# Patient Record
Sex: FEMALE | Race: BLACK OR AFRICAN AMERICAN | NOT HISPANIC OR LATINO | URBAN - METROPOLITAN AREA
[De-identification: names, ages, dates, MRNs, and addresses within clinical notes are randomized per-mention and may not be internally consistent; named-entity substitution may affect disease eponyms.]

---

## 2018-08-15 ENCOUNTER — OUTPATIENT (OUTPATIENT)
Dept: OUTPATIENT SERVICES | Facility: HOSPITAL | Age: 83
LOS: 1 days | Discharge: HOME | End: 2018-08-15

## 2018-08-15 DIAGNOSIS — I63.9 CEREBRAL INFARCTION, UNSPECIFIED: ICD-10-CM

## 2019-01-01 ENCOUNTER — OUTPATIENT (OUTPATIENT)
Dept: OUTPATIENT SERVICES | Facility: HOSPITAL | Age: 84
LOS: 1 days | Discharge: HOME | End: 2019-01-01

## 2019-01-01 DIAGNOSIS — D64.9 ANEMIA, UNSPECIFIED: ICD-10-CM

## 2019-01-01 DIAGNOSIS — N18.9 CHRONIC KIDNEY DISEASE, UNSPECIFIED: ICD-10-CM

## 2019-01-01 DIAGNOSIS — E87.1 HYPO-OSMOLALITY AND HYPONATREMIA: ICD-10-CM

## 2019-01-01 DIAGNOSIS — E21.5 DISORDER OF PARATHYROID GLAND, UNSPECIFIED: ICD-10-CM

## 2019-01-01 DIAGNOSIS — E10.9 TYPE 1 DIABETES MELLITUS WITHOUT COMPLICATIONS: ICD-10-CM

## 2019-01-01 DIAGNOSIS — E11.9 TYPE 2 DIABETES MELLITUS WITHOUT COMPLICATIONS: ICD-10-CM

## 2019-01-01 DIAGNOSIS — E61.2 MAGNESIUM DEFICIENCY: ICD-10-CM

## 2020-01-01 ENCOUNTER — INPATIENT (INPATIENT)
Facility: HOSPITAL | Age: 85
LOS: 0 days | End: 2020-04-13
Attending: HOSPITALIST | Admitting: HOSPITALIST
Payer: MEDICARE

## 2020-01-01 VITALS — DIASTOLIC BLOOD PRESSURE: 67 MMHG | SYSTOLIC BLOOD PRESSURE: 119 MMHG | HEART RATE: 141 BPM

## 2020-01-01 VITALS
SYSTOLIC BLOOD PRESSURE: 129 MMHG | HEART RATE: 133 BPM | RESPIRATION RATE: 24 BRPM | OXYGEN SATURATION: 91 % | DIASTOLIC BLOOD PRESSURE: 67 MMHG

## 2020-01-01 DIAGNOSIS — Z87.891 PERSONAL HISTORY OF NICOTINE DEPENDENCE: ICD-10-CM

## 2020-01-01 DIAGNOSIS — K59.00 CONSTIPATION, UNSPECIFIED: ICD-10-CM

## 2020-01-01 DIAGNOSIS — Z51.5 ENCOUNTER FOR PALLIATIVE CARE: ICD-10-CM

## 2020-01-01 DIAGNOSIS — U07.1 COVID-19: ICD-10-CM

## 2020-01-01 DIAGNOSIS — E87.2 ACIDOSIS: ICD-10-CM

## 2020-01-01 DIAGNOSIS — D63.1 ANEMIA IN CHRONIC KIDNEY DISEASE: ICD-10-CM

## 2020-01-01 DIAGNOSIS — I69.352 HEMIPLEGIA AND HEMIPARESIS FOLLOWING CEREBRAL INFARCTION AFFECTING LEFT DOMINANT SIDE: ICD-10-CM

## 2020-01-01 DIAGNOSIS — E11.40 TYPE 2 DIABETES MELLITUS WITH DIABETIC NEUROPATHY, UNSPECIFIED: ICD-10-CM

## 2020-01-01 DIAGNOSIS — E11.65 TYPE 2 DIABETES MELLITUS WITH HYPERGLYCEMIA: ICD-10-CM

## 2020-01-01 DIAGNOSIS — N17.9 ACUTE KIDNEY FAILURE, UNSPECIFIED: ICD-10-CM

## 2020-01-01 DIAGNOSIS — J96.01 ACUTE RESPIRATORY FAILURE WITH HYPOXIA: ICD-10-CM

## 2020-01-01 DIAGNOSIS — E87.3 ALKALOSIS: ICD-10-CM

## 2020-01-01 DIAGNOSIS — I21.A1 MYOCARDIAL INFARCTION TYPE 2: ICD-10-CM

## 2020-01-01 DIAGNOSIS — E11.22 TYPE 2 DIABETES MELLITUS WITH DIABETIC CHRONIC KIDNEY DISEASE: ICD-10-CM

## 2020-01-01 DIAGNOSIS — D72.810 LYMPHOCYTOPENIA: ICD-10-CM

## 2020-01-01 DIAGNOSIS — I12.9 HYPERTENSIVE CHRONIC KIDNEY DISEASE WITH STAGE 1 THROUGH STAGE 4 CHRONIC KIDNEY DISEASE, OR UNSPECIFIED CHRONIC KIDNEY DISEASE: ICD-10-CM

## 2020-01-01 DIAGNOSIS — N18.4 CHRONIC KIDNEY DISEASE, STAGE 4 (SEVERE): ICD-10-CM

## 2020-01-01 DIAGNOSIS — Z66 DO NOT RESUSCITATE: ICD-10-CM

## 2020-01-01 LAB
ALBUMIN SERPL ELPH-MCNC: 3.1 G/DL — LOW (ref 3.5–5.2)
ALP SERPL-CCNC: 81 U/L — SIGNIFICANT CHANGE UP (ref 30–115)
ALT FLD-CCNC: 29 U/L — SIGNIFICANT CHANGE UP (ref 0–41)
ANION GAP SERPL CALC-SCNC: 24 MMOL/L — HIGH (ref 7–14)
AST SERPL-CCNC: 28 U/L — SIGNIFICANT CHANGE UP (ref 0–41)
BASOPHILS # BLD AUTO: 0.09 K/UL — SIGNIFICANT CHANGE UP (ref 0–0.2)
BASOPHILS NFR BLD AUTO: 0.6 % — SIGNIFICANT CHANGE UP (ref 0–1)
BILIRUB SERPL-MCNC: 0.3 MG/DL — SIGNIFICANT CHANGE UP (ref 0.2–1.2)
BUN SERPL-MCNC: 89 MG/DL — CRITICAL HIGH (ref 10–20)
CALCIUM SERPL-MCNC: 9.2 MG/DL — SIGNIFICANT CHANGE UP (ref 8.5–10.1)
CHLORIDE SERPL-SCNC: 111 MMOL/L — HIGH (ref 98–110)
CO2 SERPL-SCNC: 20 MMOL/L — SIGNIFICANT CHANGE UP (ref 17–32)
CREAT SERPL-MCNC: 6.8 MG/DL — CRITICAL HIGH (ref 0.7–1.5)
CRP SERPL-MCNC: 55.99 MG/DL — HIGH (ref 0–0.4)
D DIMER BLD IA.RAPID-MCNC: 1541 NG/ML DDU — HIGH (ref 0–230)
EOSINOPHIL # BLD AUTO: 0 K/UL — SIGNIFICANT CHANGE UP (ref 0–0.7)
EOSINOPHIL NFR BLD AUTO: 0 % — SIGNIFICANT CHANGE UP (ref 0–8)
FERRITIN SERPL-MCNC: 782 NG/ML — HIGH (ref 15–150)
GLUCOSE BLDC GLUCOMTR-MCNC: 216 MG/DL — HIGH (ref 70–99)
GLUCOSE SERPL-MCNC: 263 MG/DL — HIGH (ref 70–99)
HCT VFR BLD CALC: 35.6 % — LOW (ref 37–47)
HGB BLD-MCNC: 10.7 G/DL — LOW (ref 12–16)
IMM GRANULOCYTES NFR BLD AUTO: 1.2 % — HIGH (ref 0.1–0.3)
LDH SERPL L TO P-CCNC: 647 — HIGH (ref 50–242)
LYMPHOCYTES # BLD AUTO: 0.82 K/UL — LOW (ref 1.2–3.4)
LYMPHOCYTES # BLD AUTO: 5.1 % — LOW (ref 20.5–51.1)
MCHC RBC-ENTMCNC: 27.4 PG — SIGNIFICANT CHANGE UP (ref 27–31)
MCHC RBC-ENTMCNC: 30.1 G/DL — LOW (ref 32–37)
MCV RBC AUTO: 91.3 FL — SIGNIFICANT CHANGE UP (ref 81–99)
MONOCYTES # BLD AUTO: 1.3 K/UL — HIGH (ref 0.1–0.6)
MONOCYTES NFR BLD AUTO: 8.1 % — SIGNIFICANT CHANGE UP (ref 1.7–9.3)
NEUTROPHILS # BLD AUTO: 13.67 K/UL — HIGH (ref 1.4–6.5)
NEUTROPHILS NFR BLD AUTO: 85 % — HIGH (ref 42.2–75.2)
NRBC # BLD: 0 /100 WBCS — SIGNIFICANT CHANGE UP (ref 0–0)
PLATELET # BLD AUTO: 387 K/UL — SIGNIFICANT CHANGE UP (ref 130–400)
POTASSIUM SERPL-MCNC: 5.4 MMOL/L — HIGH (ref 3.5–5)
POTASSIUM SERPL-SCNC: 5.4 MMOL/L — HIGH (ref 3.5–5)
PROCALCITONIN SERPL-MCNC: 2.3 NG/ML — HIGH (ref 0.02–0.1)
PROT SERPL-MCNC: 8.5 G/DL — HIGH (ref 6–8)
RBC # BLD: 3.9 M/UL — LOW (ref 4.2–5.4)
RBC # FLD: 16.5 % — HIGH (ref 11.5–14.5)
SARS-COV-2 RNA SPEC QL NAA+PROBE: DETECTED
SODIUM SERPL-SCNC: 155 MMOL/L — HIGH (ref 135–146)
TROPONIN T SERPL-MCNC: 0.11 NG/ML — CRITICAL HIGH
WBC # BLD: 16.07 K/UL — HIGH (ref 4.8–10.8)
WBC # FLD AUTO: 16.07 K/UL — HIGH (ref 4.8–10.8)

## 2020-01-01 PROCEDURE — 99221 1ST HOSP IP/OBS SF/LOW 40: CPT

## 2020-01-01 PROCEDURE — 99497 ADVNCD CARE PLAN 30 MIN: CPT | Mod: 25

## 2020-01-01 PROCEDURE — 99285 EMERGENCY DEPT VISIT HI MDM: CPT

## 2020-01-01 PROCEDURE — 93010 ELECTROCARDIOGRAM REPORT: CPT

## 2020-01-01 PROCEDURE — 99223 1ST HOSP IP/OBS HIGH 75: CPT

## 2020-01-01 PROCEDURE — 71045 X-RAY EXAM CHEST 1 VIEW: CPT | Mod: 26

## 2020-01-01 RX ORDER — DEXTROSE 50 % IN WATER 50 %
15 SYRINGE (ML) INTRAVENOUS ONCE
Refills: 0 | Status: DISCONTINUED | OUTPATIENT
Start: 2020-01-01 | End: 2020-01-01

## 2020-01-01 RX ORDER — DEXTROSE 50 % IN WATER 50 %
25 SYRINGE (ML) INTRAVENOUS ONCE
Refills: 0 | Status: DISCONTINUED | OUTPATIENT
Start: 2020-01-01 | End: 2020-01-01

## 2020-01-01 RX ORDER — LANOLIN ALCOHOL/MO/W.PET/CERES
1 CREAM (GRAM) TOPICAL
Qty: 0 | Refills: 0 | DISCHARGE

## 2020-01-01 RX ORDER — INSULIN LISPRO 100/ML
VIAL (ML) SUBCUTANEOUS
Refills: 0 | Status: DISCONTINUED | OUTPATIENT
Start: 2020-01-01 | End: 2020-01-01

## 2020-01-01 RX ORDER — CEFTRIAXONE 500 MG/1
1000 INJECTION, POWDER, FOR SOLUTION INTRAMUSCULAR; INTRAVENOUS EVERY 24 HOURS
Refills: 0 | Status: DISCONTINUED | OUTPATIENT
Start: 2020-01-01 | End: 2020-01-01

## 2020-01-01 RX ORDER — ERYTHROPOIETIN 10000 [IU]/ML
6000 INJECTION, SOLUTION INTRAVENOUS; SUBCUTANEOUS
Qty: 0 | Refills: 0 | DISCHARGE

## 2020-01-01 RX ORDER — MORPHINE SULFATE 50 MG/1
2 CAPSULE, EXTENDED RELEASE ORAL ONCE
Refills: 0 | Status: DISCONTINUED | OUTPATIENT
Start: 2020-01-01 | End: 2020-01-01

## 2020-01-01 RX ORDER — POLYETHYLENE GLYCOL 3350 17 G/17G
17 POWDER, FOR SOLUTION ORAL AT BEDTIME
Refills: 0 | Status: DISCONTINUED | OUTPATIENT
Start: 2020-01-01 | End: 2020-01-01

## 2020-01-01 RX ORDER — SODIUM CHLORIDE 9 MG/ML
1000 INJECTION, SOLUTION INTRAVENOUS
Refills: 0 | Status: DISCONTINUED | OUTPATIENT
Start: 2020-01-01 | End: 2020-01-01

## 2020-01-01 RX ORDER — CALCITRIOL 0.5 UG/1
0.25 CAPSULE ORAL DAILY
Refills: 0 | Status: DISCONTINUED | OUTPATIENT
Start: 2020-01-01 | End: 2020-01-01

## 2020-01-01 RX ORDER — GLUCAGON INJECTION, SOLUTION 0.5 MG/.1ML
1 INJECTION, SOLUTION SUBCUTANEOUS ONCE
Refills: 0 | Status: DISCONTINUED | OUTPATIENT
Start: 2020-01-01 | End: 2020-01-01

## 2020-01-01 RX ORDER — SODIUM BICARBONATE 1 MEQ/ML
325 SYRINGE (ML) INTRAVENOUS THREE TIMES A DAY
Refills: 0 | Status: DISCONTINUED | OUTPATIENT
Start: 2020-01-01 | End: 2020-01-01

## 2020-01-01 RX ORDER — AZITHROMYCIN 500 MG/1
1 TABLET, FILM COATED ORAL
Qty: 0 | Refills: 0 | DISCHARGE

## 2020-01-01 RX ORDER — TIOTROPIUM BROMIDE 18 UG/1
1 CAPSULE ORAL; RESPIRATORY (INHALATION) DAILY
Refills: 0 | Status: DISCONTINUED | OUTPATIENT
Start: 2020-01-01 | End: 2020-01-01

## 2020-01-01 RX ORDER — ASPIRIN AND DIPYRIDAMOLE 25; 200 MG/1; MG/1
1 CAPSULE, EXTENDED RELEASE ORAL
Refills: 0 | Status: DISCONTINUED | OUTPATIENT
Start: 2020-01-01 | End: 2020-01-01

## 2020-01-01 RX ORDER — GABAPENTIN 400 MG/1
100 CAPSULE ORAL THREE TIMES A DAY
Refills: 0 | Status: DISCONTINUED | OUTPATIENT
Start: 2020-01-01 | End: 2020-01-01

## 2020-01-01 RX ORDER — METOPROLOL TARTRATE 50 MG
1 TABLET ORAL
Qty: 0 | Refills: 0 | DISCHARGE

## 2020-01-01 RX ORDER — METRONIDAZOLE 500 MG
500 TABLET ORAL EVERY 8 HOURS
Refills: 0 | Status: DISCONTINUED | OUTPATIENT
Start: 2020-01-01 | End: 2020-01-01

## 2020-01-01 RX ORDER — ACETAMINOPHEN 500 MG
650 TABLET ORAL EVERY 6 HOURS
Refills: 0 | Status: DISCONTINUED | OUTPATIENT
Start: 2020-01-01 | End: 2020-01-01

## 2020-01-01 RX ORDER — SENNA PLUS 8.6 MG/1
1 TABLET ORAL
Qty: 0 | Refills: 0 | DISCHARGE

## 2020-01-01 RX ORDER — ASPIRIN AND DIPYRIDAMOLE 25; 200 MG/1; MG/1
1 CAPSULE, EXTENDED RELEASE ORAL
Qty: 0 | Refills: 0 | DISCHARGE

## 2020-01-01 RX ORDER — LANOLIN ALCOHOL/MO/W.PET/CERES
3 CREAM (GRAM) TOPICAL AT BEDTIME
Refills: 0 | Status: DISCONTINUED | OUTPATIENT
Start: 2020-01-01 | End: 2020-01-01

## 2020-01-01 RX ORDER — CEFTRIAXONE 500 MG/1
INJECTION, POWDER, FOR SOLUTION INTRAMUSCULAR; INTRAVENOUS
Refills: 0 | Status: DISCONTINUED | OUTPATIENT
Start: 2020-01-01 | End: 2020-01-01

## 2020-01-01 RX ORDER — HYDROXYCHLOROQUINE SULFATE 200 MG
400 TABLET ORAL EVERY 24 HOURS
Refills: 0 | Status: DISCONTINUED | OUTPATIENT
Start: 2020-01-01 | End: 2020-01-01

## 2020-01-01 RX ORDER — HYDROMORPHONE HYDROCHLORIDE 2 MG/ML
0.5 INJECTION INTRAMUSCULAR; INTRAVENOUS; SUBCUTANEOUS
Refills: 0 | Status: DISCONTINUED | OUTPATIENT
Start: 2020-01-01 | End: 2020-01-01

## 2020-01-01 RX ORDER — GABAPENTIN 400 MG/1
1 CAPSULE ORAL
Qty: 0 | Refills: 0 | DISCHARGE

## 2020-01-01 RX ORDER — ACETAMINOPHEN 500 MG
975 TABLET ORAL ONCE
Refills: 0 | Status: COMPLETED | OUTPATIENT
Start: 2020-01-01 | End: 2020-01-01

## 2020-01-01 RX ORDER — CALCITRIOL 0.5 UG/1
1 CAPSULE ORAL
Qty: 0 | Refills: 0 | DISCHARGE

## 2020-01-01 RX ORDER — AMLODIPINE BESYLATE 2.5 MG/1
1 TABLET ORAL
Qty: 0 | Refills: 0 | DISCHARGE

## 2020-01-01 RX ORDER — CHLORHEXIDINE GLUCONATE 213 G/1000ML
1 SOLUTION TOPICAL
Refills: 0 | Status: DISCONTINUED | OUTPATIENT
Start: 2020-01-01 | End: 2020-01-01

## 2020-01-01 RX ORDER — CEFTRIAXONE 500 MG/1
1000 INJECTION, POWDER, FOR SOLUTION INTRAMUSCULAR; INTRAVENOUS ONCE
Refills: 0 | Status: COMPLETED | OUTPATIENT
Start: 2020-01-01 | End: 2020-01-01

## 2020-01-01 RX ORDER — SENNA PLUS 8.6 MG/1
2 TABLET ORAL AT BEDTIME
Refills: 0 | Status: DISCONTINUED | OUTPATIENT
Start: 2020-01-01 | End: 2020-01-01

## 2020-01-01 RX ORDER — HYDROXYCHLOROQUINE SULFATE 200 MG
TABLET ORAL
Refills: 0 | Status: DISCONTINUED | OUTPATIENT
Start: 2020-01-01 | End: 2020-01-01

## 2020-01-01 RX ORDER — SODIUM BICARBONATE 1 MEQ/ML
650 SYRINGE (ML) INTRAVENOUS THREE TIMES A DAY
Refills: 0 | Status: DISCONTINUED | OUTPATIENT
Start: 2020-01-01 | End: 2020-01-01

## 2020-01-01 RX ORDER — ALBUTEROL 90 UG/1
1 AEROSOL, METERED ORAL ONCE
Refills: 0 | Status: COMPLETED | OUTPATIENT
Start: 2020-01-01 | End: 2020-01-01

## 2020-01-01 RX ORDER — METOPROLOL TARTRATE 50 MG
25 TABLET ORAL DAILY
Refills: 0 | Status: DISCONTINUED | OUTPATIENT
Start: 2020-01-01 | End: 2020-01-01

## 2020-01-01 RX ORDER — HEPARIN SODIUM 5000 [USP'U]/ML
5000 INJECTION INTRAVENOUS; SUBCUTANEOUS EVERY 8 HOURS
Refills: 0 | Status: DISCONTINUED | OUTPATIENT
Start: 2020-01-01 | End: 2020-01-01

## 2020-01-01 RX ORDER — DEXTROSE 50 % IN WATER 50 %
12.5 SYRINGE (ML) INTRAVENOUS ONCE
Refills: 0 | Status: DISCONTINUED | OUTPATIENT
Start: 2020-01-01 | End: 2020-01-01

## 2020-01-01 RX ORDER — HYDROXYCHLOROQUINE SULFATE 200 MG
800 TABLET ORAL EVERY 24 HOURS
Refills: 0 | Status: COMPLETED | OUTPATIENT
Start: 2020-01-01 | End: 2020-01-01

## 2020-01-01 RX ORDER — POLYETHYLENE GLYCOL 3350 17 G/17G
1 POWDER, FOR SOLUTION ORAL
Qty: 0 | Refills: 0 | DISCHARGE

## 2020-01-01 RX ORDER — ALBUTEROL 90 UG/1
2 AEROSOL, METERED ORAL EVERY 6 HOURS
Refills: 0 | Status: DISCONTINUED | OUTPATIENT
Start: 2020-01-01 | End: 2020-01-01

## 2020-01-01 RX ORDER — SODIUM BICARBONATE 1 MEQ/ML
0.12 SYRINGE (ML) INTRAVENOUS
Qty: 150 | Refills: 0 | Status: DISCONTINUED | OUTPATIENT
Start: 2020-01-01 | End: 2020-01-01

## 2020-01-01 RX ORDER — SODIUM BICARBONATE 1 MEQ/ML
1 SYRINGE (ML) INTRAVENOUS
Qty: 0 | Refills: 0 | DISCHARGE

## 2020-01-01 RX ADMIN — HEPARIN SODIUM 5000 UNIT(S): 5000 INJECTION INTRAVENOUS; SUBCUTANEOUS at 05:56

## 2020-01-01 RX ADMIN — Medication 100 MILLIGRAM(S): at 23:18

## 2020-01-01 RX ADMIN — SODIUM CHLORIDE 50 MILLILITER(S): 9 INJECTION, SOLUTION INTRAVENOUS at 18:52

## 2020-01-01 RX ADMIN — Medication 650 MILLIGRAM(S): at 22:38

## 2020-01-01 RX ADMIN — CEFTRIAXONE 100 MILLIGRAM(S): 500 INJECTION, POWDER, FOR SOLUTION INTRAMUSCULAR; INTRAVENOUS at 18:36

## 2020-01-01 RX ADMIN — MORPHINE SULFATE 2 MILLIGRAM(S): 50 CAPSULE, EXTENDED RELEASE ORAL at 09:02

## 2020-01-01 RX ADMIN — Medication 650 MILLIGRAM(S): at 06:39

## 2020-01-01 RX ADMIN — HEPARIN SODIUM 5000 UNIT(S): 5000 INJECTION INTRAVENOUS; SUBCUTANEOUS at 21:39

## 2020-01-01 RX ADMIN — Medication 975 MILLIGRAM(S): at 16:30

## 2020-01-01 RX ADMIN — ALBUTEROL 1 PUFF(S): 90 AEROSOL, METERED ORAL at 17:48

## 2020-01-01 RX ADMIN — Medication 100 MILLIGRAM(S): at 05:57

## 2020-04-12 NOTE — H&P ADULT - NSHPPHYSICALEXAM_GEN_ALL_CORE
Vital Signs Last 24 Hrs  T(C): --  T(F): --  HR: 114 (12 Apr 2020 15:36) (114 - 133)  BP: 129/67 (12 Apr 2020 14:55) (129/67 - 129/67)  RR: 24 (12 Apr 2020 14:55) (24 - 24)  SpO2: 91% (12 Apr 2020 14:55) (91% - 91%)      PHYSICAL EXAM:  GENERAL: NAD, not answering questions, not in apparent distress, on 10L/min by face mask.  HEAD:  Atraumatic, Normocephalic  EYES:  conjunctiva and sclera clear  NECK: Supple   CHEST/LUNG: Bilateral rhonchi  HEART: Regular rate and rhythm; tachycardic  ABDOMEN: Soft, Nontender, Nondistended; No guarding  EXTREMITIES:  2+ Peripheral Pulses, No cyanosis or edema  PSYCH: AAOx3  NEUROLOGY: non-focal  SKIN: No rashes or lesions

## 2020-04-12 NOTE — ED PROVIDER NOTE - OBJECTIVE STATEMENT
84y F w/ PMH of CVA s/p L. sided residual deficits, dementia, HTN, HLD, and DM presents with difficulty breathing that started earlier today. States was at baseline. Developed cough, fever, and difficulty breathing today. EMS called. Was found to be hypoxic to 80s. Was placed on NRB prior to transport.

## 2020-04-12 NOTE — CONSULT NOTE ADULT - ASSESSMENT
83 y/o F with SINDY in hospital for difficulty breathing. r/o covid  PMH of CVA  with residual left sided deficits, dementia, HTN, HLD, DM, TIA and CKD stage IV    # SINDY on CKD 4 : baseline cr. ~ 4.0   - likely prerenal vs ATN   - place carolyn. please document uop   - strict I/O   - hemolyzed sample. potassium on vbg ~ 4.5 noted.   - pt is dry on exam   - start iv fluids with LR at 50 cc/hr   - VBG noted.   - ok to cont. po bicarb, Ca. vitamin D supplements.   - check UA, urine creatinine, Na, Pr/Cr ratio   - check serum CPK   - BP at goal   - Hb at goal.   - mild hypernatremia noted, can be due to dehydration. trend Na level.   - trend creatinine   - Ca at goal. check MG, phos.   - SOB/ b/l opacities on CXR: r/o covid. on HCQ, Rocephin, flagyl.    - avoid nephrotoxins and hypotension   - no need for RRT   - prognosis guarded    will follow

## 2020-04-12 NOTE — ED ADULT NURSE NOTE - NSIMPLEMENTINTERV_GEN_ALL_ED
Implemented All Fall with Harm Risk Interventions:  [place] to call system. Call bell, personal items and telephone within reach. Instruct patient to call for assistance. Room bathroom lighting operational. Non-slip footwear when patient is off stretcher. Physically safe environment: no spills, clutter or unnecessary equipment. Stretcher in lowest position, wheels locked, appropriate side rails in place. Provide visual cue, wrist band, yellow gown, etc. Monitor gait and stability. Monitor for mental status changes and reorient to person, place, and time. Review medications for side effects contributing to fall risk. Reinforce activity limits and safety measures with patient and family. Provide visual clues: red socks.

## 2020-04-12 NOTE — ED ADULT NURSE NOTE - CHIEF COMPLAINT QUOTE
BIBA from [facility] fever/cough/sob/desaturation, increased lethargy, MOLST form indicates full code, unable to obtain temp

## 2020-04-12 NOTE — ED PROVIDER NOTE - PHYSICAL EXAMINATION
CONSTITUTIONAL: In mild respiratory distress.   SKIN: warm, dry  HEAD: Normocephalic; atraumatic.  EYES: PERRL, EOMI, no conjunctival erythema  ENT: No nasal discharge; airway clear.  NECK: Supple; non tender.  CARD: S1, S2 normal; no murmurs, gallops, or rubs. Tachycardic.   RESP: Increase work of breathing. Diffuse ronchi.   ABD: soft ntnd  EXT: Normal ROM.  No clubbing, cyanosis or edema.   LYMPH: No acute cervical adenopathy.  NEURO: AO x 0. Not following commands.

## 2020-04-12 NOTE — ED ADULT NURSE NOTE - OBJECTIVE STATEMENT
pt arrival BIBEMS to ER critical care area from [facility] for fever shortness of breath cough and poor spo2 saturdation. pt presents to ER today contracted able to retract from painful stimuli 120's on cardiac monitoring placed on pulse oximetry with BP. pt presents lethargic responds to call of name, strong  on right hand. IV inserted 18g to right lower arm, blood cultures drawn and laboratory specimens drawn. will continue to closely monitor fall safety measures maintained.

## 2020-04-12 NOTE — H&P ADULT - ASSESSMENT
Ms. Lock is an 83 yo female patient from [facility] with PMH of CVA  with residual left sided deficits, dementia, HTN, HLD, DM, TIA and CKD stage IV, brought in tonight due to difficulty breathing that started earlier today.     # Suspected COVID-19 lower respiratory tract infection  # acute hypoxemic respiratory failure  # Possible aspiration pneumonia  - Tachycardia, tachypnea Ms. Lock is an 83 yo female patient from [facility] with PMH of CVA  with residual left sided deficits, dementia, HTN, HLD, DM, TIA and CKD stage IV, brought in tonight due to difficulty breathing that started earlier today.     # Suspected COVID-19 lower respiratory tract infection  # acute hypoxemic respiratory failure  # Possible aspiration pneumonia  - Tachycardia, tachypnea, hypoxia on admission  - Lymphopenia, elevated LDH. D-Dimer : 1450  - Chest Xray: bilateral infiltrates, more on the right side  - Saturation: 94% on 10 L/min O2.   - COVID-19 PCR sent. Maintain airborne precautions pending results.   - CRP, procalcitonin, ferritin received by lab  - QTc: 425, will start Plaquenil. No need to monitor QTc, Received 2 days of azithromycin.   - Given leukocytosis, and possible aspiration, will start ceftriaxone + metronidazole pending procalcitonin  - Albuterol + tiotropium + Symbicort.   - Speech and swallow evaluation.     # SINDY on CKD stage IV  - Unknown baseline creatinine  - No indication for urgent dialysis.   - Evaluated by nephrology, recommend LR: 50 cc/hour + accurate intake/output at this time  - Continue oral sodium bicarbonate + calcitriol + vitamin D    # Ischemic CVA with left sided weakness.   - Continue aspirin + dipyridamole     # HTN  - Holding amlodipine  - Continue metoprolol    # DM  - NPO  - Insulin by sliding scale if needed Ms. Lock is an 83 yo female patient from [facility] with PMH of CVA  with residual left sided deficits, dementia, HTN, HLD, DM, TIA and CKD stage IV, brought in tonight due to difficulty breathing that started earlier today.     # Suspected COVID-19 lower respiratory tract infection  # acute hypoxemic respiratory failure  # Possible aspiration pneumonia  - Tachycardia, tachypnea, hypoxia on admission  - Lymphopenia, elevated LDH. D-Dimer : 1450  - Chest Xray: bilateral infiltrates, more on the right side  - Saturation: 94% on 10 L/min O2.   - COVID-19 PCR sent. Maintain airborne precautions pending results.   - CRP, procalcitonin, ferritin received by lab  - QTc: 425, will start Plaquenil. No need to monitor QTc, Received 2 days of azithromycin.   - Given leukocytosis, and possible aspiration, will start ceftriaxone + metronidazole pending procalcitonin  - Albuterol + tiotropium + Symbicort.   - Speech and swallow evaluation.     # SINDY on CKD stage IV  - Unknown baseline creatinine  - No indication for urgent dialysis.   - Evaluated by nephrology, recommend LR: 50 cc/hour + accurate intake/output at this time  - Continue oral sodium bicarbonate + calcitriol + vitamin D    # Ischemic CVA with left sided weakness.   - Continue aspirin + dipyridamole     # HTN  - Holding amlodipine  - Continue metoprolol    # DM  - NPO  - Insulin by sliding scale if needed    DVT prophylaxis: Heparin 5000 s/c Q 8hours    COde status: Patient has advance directives, and MOLST form filled and last updated in December 2019, requesting CPR, intubation, and no limitation in medical treatment. Spoke with niece and confirmed her Aunt wishes Ms. Lock is an 83 yo female patient from [facility] with PMH of CVA  with residual left sided deficits, dementia, HTN, HLD, DM, TIA and CKD stage IV, brought in tonight due to difficulty breathing that started earlier today.     # Suspected COVID-19 lower respiratory tract infection  # acute hypoxemic respiratory failure  # Possible aspiration pneumonia  - Tachycardia, tachypnea, hypoxia on admission  - Lymphopenia, elevated LDH. D-Dimer : 1450  - Chest Xray: bilateral infiltrates, more on the right side  - Saturation: 94% on 10 L/min O2.   - COVID-19 PCR sent. Maintain airborne precautions pending results.   - CRP, procalcitonin, ferritin received by lab  - QTc: 425, will start Plaquenil. No need to monitor QTc, Received 2 days of azithromycin.   - Given leukocytosis, and possible aspiration, will start ceftriaxone + metronidazole pending procalcitonin  - Albuterol + tiotropium + Symbicort.   - Speech and swallow evaluation.   - MRSA screen    # ISNDY on CKD stage IV  - Unknown baseline creatinine  - No indication for urgent dialysis.   - Evaluated by nephrology, recommend LR: 50 cc/hour + accurate intake/output at this time  - Continue oral sodium bicarbonate + calcitriol + vitamin D    # Ischemic CVA with left sided weakness.   - Continue aspirin + dipyridamole     # HTN  - Holding amlodipine  - Continue metoprolol    # DM  - NPO  - Insulin by sliding scale if needed    DVT prophylaxis: Heparin 5000 s/c Q 8hours    COde status: Patient has advance directives, and MOLST form filled and last updated in December 2019, requesting CPR, intubation, and no limitation in medical treatment. Spoke with niece and confirmed her Aunt wishes

## 2020-04-12 NOTE — H&P ADULT - HISTORY OF PRESENT ILLNESS
Ms. Lock is an 85 yo female patient from [facility] with PMH of CVA  with residual left sided deficits, dementia, HTN, HLD, DM, TIA and CKD stage IV, brought in tonight due to difficulty breathing that started earlier today. Report noted that patient was at baseline. Developed cough, fever, and difficulty breathing today. EMS called. Was found to be hypoxic to 80s. Was placed on face mask prior to transport.     In ED, patient Ms. Lock is an 85 yo female patient from [facility] with PMH of CVA  with residual left sided deficits, dementia, HTN, HLD, DM, TIA and CKD stage IV, brought in tonight due to difficulty breathing that started earlier today. Report noted that patient was at baseline. Developed cough, fever, and difficulty breathing today. EMS called. Was found to be hypoxic to 80s. Was placed on face mask prior to transport.     In ED, patient was hypoxic, tachycardic, normotensive, saturating 91% on 8L/min. Chest Xray showed bilateral infiltrates, more on the right lower lobe. COVID-19 PCR sent. Patient is very poor historian, called the niece who said patient is hypotensive since admission to nursing home, and does not speak, and she says only few words when she visits her (last visit 2 weeks ago).

## 2020-04-12 NOTE — H&P ADULT - NSHPLABSRESULTS_GEN_ALL_CORE
Labs:                            10.7   16.07 )-----------( 387      ( 12 Apr 2020 16:10 )             35.6       04-12    155<H>  |  111<H>  |  89<HH>  ----------------------------<  263<H>  5.4<H>   |  20  |  6.8<HH>    Ca    9.2      12 Apr 2020 16:10    TPro  8.5<H>  /  Alb  3.1<L>  /  TBili  0.3  /  DBili  x   /  AST  28  /  ALT  29  /  AlkPhos  81  04-12          Lactate Trend      CARDIAC MARKERS ( 12 Apr 2020 16:10 )  x     / 0.11 ng/mL / x     / x     / x            CAPILLARY BLOOD GLUCOSE      Chest Xray: bilateral infiltrates.    ECG: Sinus tachycardia, Q waves in inferior leads, QTc 420

## 2020-04-12 NOTE — H&P ADULT - NSICDXPASTMEDICALHX_GEN_ALL_CORE_FT
PAST MEDICAL HISTORY:  Chronic kidney disease     CVA (cerebrovascular accident)     Dementia     Diabetes mellitus     Hyperlipidemia     Hypertension

## 2020-04-12 NOTE — ED ADULT TRIAGE NOTE - CHIEF COMPLAINT QUOTE
BIBA from [facility] fever/cough/sob/desaturation, increased lethargy, MOLST form indicates full code, unable to obtain temp

## 2020-04-12 NOTE — CONSULT NOTE ADULT - SUBJECTIVE AND OBJECTIVE BOX
NEPHROLOGY CONSULTATION NOTE    Ms. Lock is an 85 yo female patient from [facility] with PMH of CVA  with residual left sided deficits, dementia, HTN, HLD, DM, TIA and CKD stage IV, brought in tonight due to difficulty breathing that started earlier today. Report noted that patient was at baseline. Developed cough, fever, and difficulty breathing today. EMS called. Was found to be hypoxic to 80s. Was placed on face mask prior to transport.     In ED, patient was hypoxic, tachycardic, normotensive, saturating 91% on 8L/min. Chest Xray showed bilateral infiltrates, more on the right lower lobe. COVID-19 PCR sent. Patient is very poor historian, called the niece who said patient is hypotensive since admission to nursing home, and does not speak, and she says only few words when she visits her (last visit 2 weeks ago). (4/12/2020)    PAST MEDICAL & SURGICAL HISTORY:  Hypertension  Chronic kidney disease  Dementia  Hyperlipidemia  Diabetes mellitus  CVA (cerebrovascular accident)    Allergies:  No Known Allergies    Home Medications:  amLODIPine 10 mg oral tablet: 1 tab(s) orally once a day (12 Apr 2020 17:56)  aspirin-dipyridamole 25 mg-200 mg oral capsule, extended release: 1 cap(s) orally 2 times a day (12 Apr 2020 17:56)  azithromycin 250 mg oral tablet: 1 tab(s) orally once a day (12 Apr 2020 17:56)  calcitriol 0.25 mcg oral capsule: 1 cap(s) orally once a day (12 Apr 2020 17:56)  Calcium 600+D 600 mg-200 intl units (5 mcg) oral tablet: 1 tab(s) orally 2 times a day (12 Apr 2020 17:56)  epoetin pebbles 3000 units/mL injectable solution: 6000 unit(s) injectable every 7 days (12 Apr 2020 17:56)  gabapentin 100 mg oral capsule: 1 cap(s) orally 3 times a day (12 Apr 2020 17:56)  Melatonin 3 mg oral tablet: 1 tab(s) orally once a day (at bedtime) (12 Apr 2020 17:56)  Metoprolol Succinate ER 25 mg oral tablet, extended release: 1 tab(s) orally once a day (12 Apr 2020 17:56)  polyethylene glycol 3350 oral powder for reconstitution: 1 pad(s) orally once a day (at bedtime) (12 Apr 2020 17:56)  Senna 8.6 mg oral tablet: 1 tab(s) orally once a day (at bedtime) (12 Apr 2020 17:56)  sodium bicarbonate 325 mg oral tablet: 1 tab(s) orally 3 times a day (12 Apr 2020 17:56)    Hospital Medications:   MEDICATIONS  (STANDING):  calcitriol   Capsule 0.25 MICROGram(s) Oral daily  calcium carbonate 1250 mG  + Vitamin D (OsCal 500 + D) 1 Tablet(s) Oral daily  cefTRIAXone   IVPB      cefTRIAXone   IVPB 1000 milliGRAM(s) IV Intermittent once  chlorhexidine 4% Liquid 1 Application(s) Topical <User Schedule>  dipyridamole 200 mG/aspirin 25 mG 1 Capsule(s) Oral two times a day  gabapentin 100 milliGRAM(s) Oral three times a day  heparin  Injectable 5000 Unit(s) SubCutaneous every 8 hours  hydroxychloroquine 800 milliGRAM(s) Oral every 24 hours  hydroxychloroquine   Oral   lactated ringers. 1000 milliLiter(s) (50 mL/Hr) IV Continuous <Continuous>  melatonin 3 milliGRAM(s) Oral at bedtime  metoprolol succinate ER 25 milliGRAM(s) Oral daily  metroNIDAZOLE  IVPB 500 milliGRAM(s) IV Intermittent every 8 hours  polyethylene glycol 3350 17 Gram(s) Oral at bedtime  senna 2 Tablet(s) Oral at bedtime  sodium bicarbonate 325 milliGRAM(s) Oral three times a day  tiotropium 18 MICROgram(s) Capsule 1 Capsule(s) Inhalation daily      SOCIAL HISTORY:  Denies ETOH,Smoking,   FAMILY HISTORY:        REVIEW OF SYSTEMS:    All other review of systems is negative unless indicated above.    VITALS:  T(F): --  HR: 114 (04-12-20 @ 15:36)  BP: 129/67 (04-12-20 @ 14:55)  RR: 24 (04-12-20 @ 14:55)  SpO2: 91% (04-12-20 @ 14:55)      Weight (kg): 60 (04-12 @ 17:28)    I&O's Detail        PHYSICAL EXAM:  Constitutional: NAD  Respiratory: b/l rhonchi  Cardiovascular: S1, S2, RRR  Gastrointestinal: BS+, soft, NT/ND  Extremities:  No peripheral edema  Neurological: A/O x 1  :  No leon.     Vascular Access:    LABS:  04-12    155<H>  |  111<H>  |  89<HH>  ----------------------------<  263<H>  5.4<H>   |  20  |  6.8<HH>    Ca    9.2      12 Apr 2020 16:10    TPro  8.5<H>  /  Alb  3.1<L>  /  TBili  0.3  /  DBili      /  AST  28  /  ALT  29  /  AlkPhos  81  04-12    Creatinine Trend: 6.8 <--                        10.7   16.07 )-----------( 387      ( 12 Apr 2020 16:10 )             35.6     Troponin T, Serum: 0.11 ng/mL (04.12.20 @ 16:10)    Blood Gas Venous - Potassium: 4.5 mmoL/L (04.12.20 @ 16:33)  Blood Gas Profile - Venous (04.12.20 @ 16:33)    pH, Venous: 7.38    pCO2, Venous: 41 mmHg    pO2, Venous: 38 mmHg    HCO3, Venous: 24 mmoL/L    Base Excess, Venous: -0.7 mmoL/L    Oxygen Saturation, Venous: 62 %      Urine Studies:    RADIOLOGY & ADDITIONAL STUDIES:

## 2020-04-12 NOTE — H&P ADULT - ATTENDING COMMENTS
I saw and evaluated the patient. I have reviewed and agree with the findings and plan of care as documented above in the resident’s note (unless indicated differently below). Any necessary changes were made in the body of the text.    85 yo F pt from [facility] w/ a hx of CVA (residual left sided deficits), dementia, CKD4 and multiple comorbid ailments. Coming in after being sent for evaluation of shortness of breath. Onset one day ago. Course progressive and persistent w/ worsening. Associated w/ coughing and fever at the NH. No alleviating or exacerbating factors. Sick contacts: potentially other ill NH patients. No travel history. EMS found the patient hypoxic into the 80’s. The patient was placed on supplemental O2 and transported to the hospital thereafter.    ROS: the patient is unable to provide hx (as per maryann, the pt can only say a few words at baseline)  PMHx: CVA (left sided weakness), dementia, HTN, HLD, DM, TIA, CKD4    Home Meds: amlodipine 10 mg daily; ASA-dipyridamole  XR bid, azithromycin 200 mg daily, calcitriol 0.25 mcg daily, Ca-vitD 600 mg-200IU bid, epoietin pebbles qweekly, gabapentin 100 mg tid, melatonin 3 mg qHS, metoprolol succinate 25 mg daily, polyethylene glycol qHS, senna qHS, Na-bicarb 325 mg tid    SurHx: unknown (pt unable to provide)  FHx: unknown (pt unable to provide)  SocHx: no tobacco, alcohol or illicit drug use; former smoker    Exam:  Vitals: BP = 151/72; P = 128; T = 100.4; RR = 20  General: in bed; anxious appearing; labored breathing; upper airway congestion  Eyes: anicteric sclerae; moist conjunctiva  HENT: AT/NC; clear oropharynx w/ dry mucous membranes  Neck: supple; trachea midline  Lungs: bibasilar crackles (extending up to the mid lung field on the left); rhonci; no wheezing; +tachypneic and w/ somewhat labored breathing  CVS: regular rhythm; tachycardia; S1 and S2 w/o MRG’s  Abd: BS+; soft and non-tender to palpation; no masses or HSM  Extremities: LE non-edematous; peripheral pulses 2+ b/l  Skin: no decubitus ulcers noted; warm with proper turgor and texture on palpation except some atrophy  Psych: appears anxious and restless; alert; unable to ascertain orientation (pt does not respond to questions)    Labs significant for WBC 16.07, H&H 10.7/35.6, d-dimer 1541, Na 155, K 5.4 (hemolyzed), AG 24, bicarb 20, BUN/Cr 89/6.8, glucose 263, protein 8.5 w/ albumin 3.1, , trop 0.11, pH 7.38/pCO2 41  CXR: b/l airspace opacities (left > right)  EKG: sinus tach qTc 438  NH paperwork reviewed – information included herein (within the body of the note)  Old records in chart reviewed – information included herein (within the body of the note)    Assessment:  (1) Acute respiratory failure w/ hypoxia 2/2 viral respiratory syndrome r/o SARS-CoV-2 infection vs. a possible bacterial CAP  (2) SINDY on CKD4 – likely pre-renal vs. ATN vs. progression of disease  (3) AG metabolic acidosis w/ metabolic alkalosis (delta gap 3)  (4) DM w/ hyperglycemia and neuropathy  (5) Cardiac enzyme elevation likely 2/2 decreased clearance and demand (type II MI)  (6) Hyperkalemia on hemolyzed metabolic panel: need to repeat testing  (7) HTN – elevated BP  (8) Hx of CVA w/ residual deficits – ongoing concern that increases morbidity  (9) Anemia of chronic kidney disease  (10) Constipation    Plan:  (1) Supportive care: anti-emetics/anti-pyretics/analgesics/anti-tussives  (2) Goal SpO2 > 92: titrate O2 as appropriate  (3) Isolation precautions: airborne/droplet/contact  (4) Covid-19 PCR, CRP and procalcitonin level  (5) Daily CBC and CMP; check inflammatory markers; monitor H-score  (6) F/u blood culture and U/A as well as MRSA nares  (7) Start hydroxychloroquine  (8) Agree with empiric antibiotic coverage at this time  (9) Obtain U/A, urine protein:creatinine ratio and urine electrolytes  (10) Strict intake and output monitoring  (11) Gentle hydration: LR at 50 mL/hr is fine  (12) Avoid nephrotoxic agents  (13) C/w sodium bicarb tabs  (14) Blood glucose monitoring qAC and qHS w/ basal bolus to maintain goal BG < 180 mg/dL  (15) Repeat EKG in AM and trend cardiac enzyme levels  (16) Repeat K lvl with AM labs  (17) Bowel regimen  (18) Medications as dosed  (19) DVT ppx  (20) Speech and swallow evaluation    Prognosis guard: risk of significant clinical, especially pulmonary decline (is on NRBR at 15 L/min) saturating in the low 90’s and desaturates soon after the mask is removed (pt has been pulling at her own mask for most of the night necessitating restraints for this life-saving intervention).    Advanced directives: MOLST form from 12/2019 stating that all medical interventions are to be undertaken as necessary, was re-confirmed w/ the patient’s niece earlier today.  As such, the patient is full code. I saw and evaluated the patient. I have reviewed and agree with the findings and plan of care as documented above in the resident’s note (unless indicated differently below). Any necessary changes were made in the body of the text.    85 yo F pt from [facility] w/ a hx of CVA (residual left sided deficits), dementia, CKD4 and multiple comorbid ailments. Coming in after being sent for evaluation of shortness of breath. Onset one day ago. Course progressive and persistent w/ worsening. Associated w/ coughing and fever at the NH. No alleviating or exacerbating factors. Sick contacts: potentially other ill NH patients. No travel history. EMS found the patient hypoxic into the 80’s. The patient was placed on supplemental O2 and transported to the hospital thereafter.    ROS: the patient is unable to provide hx (as per maryann, the pt can only say a few words at baseline)  PMHx: CVA (left sided weakness), dementia, HTN, HLD, DM, TIA, CKD4    Home Meds: amlodipine 10 mg daily; ASA-dipyridamole  XR bid, azithromycin 200 mg daily, calcitriol 0.25 mcg daily, Ca-vitD 600 mg-200IU bid, epoietin pebbles qweekly, gabapentin 100 mg tid, melatonin 3 mg qHS, metoprolol succinate 25 mg daily, polyethylene glycol qHS, senna qHS, Na-bicarb 325 mg tid    SurHx: unknown (pt unable to provide)  FHx: unknown (pt unable to provide)  SocHx: no tobacco, alcohol or illicit drug use; former smoker    Exam:  Vitals: BP = 151/72; P = 128; T = 100.4; RR = 20  General: in bed; anxious appearing; labored breathing; upper airway congestion  Eyes: anicteric sclerae; moist conjunctiva  HENT: AT/NC; clear oropharynx w/ dry mucous membranes  Neck: supple; trachea midline  Lungs: bibasilar crackles (extending up to the mid lung field on the left); rhonci; no wheezing; +tachypneic and w/ somewhat labored breathing  CVS: regular rhythm; tachycardia; S1 and S2 w/o MRG’s  Abd: BS+; soft and non-tender to palpation; no masses or HSM  Extremities: LE non-edematous; peripheral pulses 2+ b/l  Skin: no decubitus ulcers noted; warm with proper turgor and texture on palpation except some atrophy  Psych: appears anxious and restless; alert; unable to ascertain orientation (pt does not respond to questions)    Labs significant for WBC 16.07, H&H 10.7/35.6, d-dimer 1541, Na 155, K 5.4 (hemolyzed), AG 24, bicarb 20, BUN/Cr 89/6.8, glucose 263, protein 8.5 w/ albumin 3.1, , trop 0.11, pH 7.38/pCO2 41  CXR: b/l airspace opacities (left > right)  EKG: sinus tach qTc 438  NH paperwork reviewed – information included herein (within the body of the note)  Old records in chart reviewed – information included herein (within the body of the note)    Assessment:  (1) Acute respiratory failure w/ hypoxia 2/2 viral respiratory syndrome r/o SARS-CoV-2 infection vs. a possible bacterial CAP  (2) SINDY on CKD4 – likely pre-renal vs. ATN vs. progression of disease  (3) AG metabolic acidosis w/ metabolic alkalosis (delta gap 3)  (4) DM w/ hyperglycemia and neuropathy  (5) Cardiac enzyme elevation likely 2/2 decreased clearance and demand (type II MI)  (6) Hyperkalemia on hemolyzed metabolic panel: need to repeat testing  (7) HTN – elevated BP  (8) Hx of CVA w/ residual deficits – ongoing concern that increases morbidity  (9) Anemia of chronic kidney disease  (10) Constipation    Plan:  (1) Supportive care: anti-emetics/anti-pyretics/analgesics/anti-tussives  (2) Goal SpO2 > 92: titrate O2 as appropriate  (3) Isolation precautions: airborne/droplet/contact  (4) Covid-19 PCR, CRP and procalcitonin level  (5) Daily CBC and CMP; check inflammatory markers; monitor H-score  (6) F/u blood culture and U/A as well as MRSA nares  (7) Start hydroxychloroquine  (8) Agree with empiric antibiotic coverage at this time  (9) Obtain U/A, urine protein:creatinine ratio and urine electrolytes  (10) Strict intake and output monitoring  (11) Gentle hydration: LR at 50 mL/hr is fine  (12) Avoid nephrotoxic agents  (13) C/w sodium bicarb tabs  (14) Blood glucose monitoring qAC and qHS w/ basal bolus to maintain goal BG < 180 mg/dL  (15) Repeat EKG in AM and trend cardiac enzyme levels  (16) Repeat K lvl with AM labs  (17) Bowel regimen  (18) Medications as dosed  (19) DVT ppx  (20) Speech and swallow evaluation    Prognosis guarded: risk of significant clinical (especially respiratory) decline (is on NRBR at 15 L/min) in this elderly female patient w/ multiple systemic ailments and dementia who is saturating in the low 90’s and desaturates soon after the mask is removed (pt has been pulling at her own mask for most of the night necessitating restraints for this life-saving intervention).    Advanced directives: MOLST form from 12/2019 stating that all medical interventions are to be undertaken as necessary, was re-confirmed w/ the patient’s niece earlier today.  As such, the patient is full code.

## 2020-04-13 NOTE — CONSULT NOTE ADULT - SUBJECTIVE AND OBJECTIVE BOX
REQUESTED OF: DR Bland    Chart reviewed, Hospital Day 2    JOLIE LOCK 84yFemale  HPI:  Ms. Lock is an 83 yo female patient from [facility] with PMH of CVA  with residual left sided deficits, dementia, HTN, HLD, DM, TIA and CKD stage IV, brought in tonight due to difficulty breathing that started earlier today. Report noted that patient was at baseline. Developed cough, fever, and difficulty breathing today. EMS called. Was found to be hypoxic to 80s. Was placed on face mask prior to transport.     In ED, patient was hypoxic, tachycardic, normotensive, saturating 91% on 8L/min. Chest Xray showed bilateral infiltrates, more on the right lower lobe. COVID-19 PCR sent. Patient is very poor historian, called the niece who said patient is hypotensive since admission to nursing home, and does not speak, and she says only few words when she visits her (last visit 2 weeks ago). (12 Apr 2020 17:35)        PAST MEDICAL & SURGICAL HISTORY:  Hypertension  Chronic kidney disease  Dementia  Hyperlipidemia  Diabetes mellitus  CVA (cerebrovascular accident)      Subjective and Objective:  Today,  Discussed with Dr An, admitting resident    Focused Palliative Care Evaluation:                   Symptoms:                                      Pain                                     Dyspnea                                     N/V                                     Appetite                                     Anxiety                                     Other _____________________                     Support Devices:              PHYSICAL EXAM:      Constitutional:    Eyes:    ENMT:    Neck:    Breasts:    Back:    Respiratory:    Cardiovascular:    Gastrointestinal:    Genitourinary:    Rectal:    Extremities:    Vascular:    Neurological:    Skin:    Lymph Nodes:    Musculoskeletal:    Psychiatric:        T(C): 39.1, Max: 39.1 (06:30)  HR: 141 (114 - 141)  BP: 119/67 (119/67 - 155/75)  RR: 26 (20 - 26)  SpO2: 93% (91% - 94%)      LABS/STUDIES:  04-12    155<H>  |  111<H>  |  89<HH>  ----------------------------<  263<H>  5.4<H>   |  20  |  6.8<HH>    Ca    9.2      12 Apr 2020 16:10    TPro  8.5<H>  /  Alb  3.1<L>  /  TBili  0.3  /  DBili  x   /  AST  28  /  ALT  29  /  AlkPhos  81  04-12                            10.7   16.07 )-----------( 387      ( 12 Apr 2020 16:10 )             35.6       MEDICATIONS  (STANDING):  calcitriol   Capsule 0.25 MICROGram(s) Oral daily  calcium carbonate 1250 mG  + Vitamin D (OsCal 500 + D) 1 Tablet(s) Oral daily  cefTRIAXone   IVPB      cefTRIAXone   IVPB 1000 milliGRAM(s) IV Intermittent every 24 hours  chlorhexidine 4% Liquid 1 Application(s) Topical <User Schedule>  dextrose 5%. 1000 milliLiter(s) (50 mL/Hr) IV Continuous <Continuous>  dextrose 50% Injectable 12.5 Gram(s) IV Push once  dextrose 50% Injectable 25 Gram(s) IV Push once  dextrose 50% Injectable 25 Gram(s) IV Push once  dipyridamole 200 mG/aspirin 25 mG 1 Capsule(s) Oral two times a day  gabapentin 100 milliGRAM(s) Oral three times a day  heparin  Injectable 5000 Unit(s) SubCutaneous every 8 hours  hydroxychloroquine 400 milliGRAM(s) Oral every 24 hours  hydroxychloroquine   Oral   insulin lispro (HumaLOG) corrective regimen sliding scale   SubCutaneous three times a day before meals  lactated ringers. 1000 milliLiter(s) (50 mL/Hr) IV Continuous <Continuous>  metoprolol succinate ER 25 milliGRAM(s) Oral daily  metroNIDAZOLE  IVPB 500 milliGRAM(s) IV Intermittent every 8 hours  polyethylene glycol 3350 17 Gram(s) Oral at bedtime  senna 2 Tablet(s) Oral at bedtime  sodium bicarbonate 650 milliGRAM(s) Oral three times a day  tiotropium 18 MICROgram(s) Capsule 1 Capsule(s) Inhalation daily    MEDICATIONS  (PRN):  acetaminophen   Tablet .. 650 milliGRAM(s) Oral every 6 hours PRN Temp greater or equal to 38C (100.4F), Mild Pain (1 - 3)  ALBUTerol    90 MICROgram(s) HFA Inhaler 2 Puff(s) Inhalation every 6 hours PRN Shortness of Breath and/or Wheezing  dextrose 40% Gel 15 Gram(s) Oral once PRN Blood Glucose LESS THAN 70 milliGRAM(s)/deciliter  glucagon  Injectable 1 milliGRAM(s) IntraMuscular once PRN Glucose LESS THAN 70 milligrams/deciliter          iStop:         PPS  Level    10%       Note PPS = Palliative Performance Scale; (c)2001, [facility]a Hospice Society       Range from 100% meaning Full ambulation/self-care/intake/Level of Consicous                                                                              to        10% meaning Bedbound/Unable to do any activity/extensive disease /Total Care/ No PO intake/ LOC=Full/drowsy/+/-confusion        (0% = death)                     Prior to acute illness, patient's functionality reportedly nursing home pt (+) debility REQUESTED OF: DR Bland    Chart reviewed, Hospital Day 2    JOLIE LOCK 84yFemale  HPI:  Ms. Lock is an 85 yo female patient from [facility] with PMH of CVA  with residual left sided deficits, dementia, HTN, HLD, DM, TIA and CKD stage IV, brought in tonight due to difficulty breathing that started earlier today. Report noted that patient was at baseline. Developed cough, fever, and difficulty breathing today. EMS called. Was found to be hypoxic to 80s. Was placed on face mask prior to transport.     In ED, patient was hypoxic, tachycardic, normotensive, saturating 91% on 8L/min. Chest Xray showed bilateral infiltrates, more on the right lower lobe. COVID-19 PCR sent. Patient is very poor historian, called the niece who said patient is hypotensive since admission to nursing home, and does not speak, and she says only few words when she visits her (last visit 2 weeks ago). (12 Apr 2020 17:35)        PAST MEDICAL & SURGICAL HISTORY:  Hypertension  Chronic kidney disease  Dementia  Hyperlipidemia  Diabetes mellitus  CVA (cerebrovascular accident)      Subjective and Objective:  Today,  Discussed with Dr An, admitting resident    Focused Palliative Care Evaluation:                   Symptoms:                                      Pain                                     Dyspnea YES                                     N/V                                     Appetite                                     Anxiety                                     Other _____________________                     Support Devices: non rebreather mask 13 liters              PHYSICAL EXAM:      Constitutional:    Eyes:    ENMT:    Neck:    Breasts:    Back:    Respiratory:    Cardiovascular:    Gastrointestinal:    Genitourinary:    Rectal:    Extremities:    Vascular:    Neurological:    Skin:    Lymph Nodes:    Musculoskeletal:    Psychiatric:        T(C): 39.1, Max: 39.1 (06:30)  HR: 141 (114 - 141)  BP: 119/67 (119/67 - 155/75)  RR: 26 (20 - 26)  SpO2: 93% (91% - 94%)      LABS/STUDIES:  04-12    155<H>  |  111<H>  |  89<HH>  ----------------------------<  263<H>  5.4<H>   |  20  |  6.8<HH>    Ca    9.2      12 Apr 2020 16:10    TPro  8.5<H>  /  Alb  3.1<L>  /  TBili  0.3  /  DBili  x   /  AST  28  /  ALT  29  /  AlkPhos  81  04-12                            10.7   16.07 )-----------( 387      ( 12 Apr 2020 16:10 )             35.6       MEDICATIONS  (STANDING):  calcitriol   Capsule 0.25 MICROGram(s) Oral daily  calcium carbonate 1250 mG  + Vitamin D (OsCal 500 + D) 1 Tablet(s) Oral daily  cefTRIAXone   IVPB      cefTRIAXone   IVPB 1000 milliGRAM(s) IV Intermittent every 24 hours  chlorhexidine 4% Liquid 1 Application(s) Topical <User Schedule>  dextrose 5%. 1000 milliLiter(s) (50 mL/Hr) IV Continuous <Continuous>  dextrose 50% Injectable 12.5 Gram(s) IV Push once  dextrose 50% Injectable 25 Gram(s) IV Push once  dextrose 50% Injectable 25 Gram(s) IV Push once  dipyridamole 200 mG/aspirin 25 mG 1 Capsule(s) Oral two times a day  gabapentin 100 milliGRAM(s) Oral three times a day  heparin  Injectable 5000 Unit(s) SubCutaneous every 8 hours  hydroxychloroquine 400 milliGRAM(s) Oral every 24 hours  hydroxychloroquine   Oral   insulin lispro (HumaLOG) corrective regimen sliding scale   SubCutaneous three times a day before meals  lactated ringers. 1000 milliLiter(s) (50 mL/Hr) IV Continuous <Continuous>  metoprolol succinate ER 25 milliGRAM(s) Oral daily  metroNIDAZOLE  IVPB 500 milliGRAM(s) IV Intermittent every 8 hours  polyethylene glycol 3350 17 Gram(s) Oral at bedtime  senna 2 Tablet(s) Oral at bedtime  sodium bicarbonate 650 milliGRAM(s) Oral three times a day  tiotropium 18 MICROgram(s) Capsule 1 Capsule(s) Inhalation daily    MEDICATIONS  (PRN):  acetaminophen   Tablet .. 650 milliGRAM(s) Oral every 6 hours PRN Temp greater or equal to 38C (100.4F), Mild Pain (1 - 3)  ALBUTerol    90 MICROgram(s) HFA Inhaler 2 Puff(s) Inhalation every 6 hours PRN Shortness of Breath and/or Wheezing  dextrose 40% Gel 15 Gram(s) Oral once PRN Blood Glucose LESS THAN 70 milliGRAM(s)/deciliter  glucagon  Injectable 1 milliGRAM(s) IntraMuscular once PRN Glucose LESS THAN 70 milligrams/deciliter          iStop:         PPS  Level    10%       Note PPS = Palliative Performance Scale; (c)2001, [facility] Hospice Society       Range from 100% meaning Full ambulation/self-care/intake/Level of Consicous                                                                              to        10% meaning Bedbound/Unable to do any activity/extensive disease /Total Care/ No PO intake/ LOC=Full/drowsy/+/-confusion        (0% = death)                     Prior to acute illness, patient's functionality reportedly nursing home pt (+) debility REQUESTED OF: DR Bland    Chart reviewed, Hospital Day 2    JOLIE LOCK 84yFemale  HPI:  Ms. Lock is an 85 yo female patient from [facility] with PMH of CVA  with residual left sided deficits, dementia, HTN, HLD, DM, TIA and CKD stage IV, brought in tonight due to difficulty breathing that started earlier today. Report noted that patient was at baseline. Developed cough, fever, and difficulty breathing today. EMS called. Was found to be hypoxic to 80s. Was placed on face mask prior to transport.     In ED, patient was hypoxic, tachycardic, normotensive, saturating 91% on 8L/min. Chest Xray showed bilateral infiltrates, more on the right lower lobe. COVID-19 PCR sent. Patient is very poor historian, called the niece who said patient is hypotensive since admission to nursing home, and does not speak, and she says only few words when she visits her (last visit 2 weeks ago). (12 Apr 2020 17:35)        PAST MEDICAL & SURGICAL HISTORY:  Hypertension  Chronic kidney disease  Dementia  Hyperlipidemia  Diabetes mellitus  CVA (cerebrovascular accident)      Subjective and Objective:  Today,  Discussed with Dr An, admitting resident    Focused Palliative Care Evaluation:                   Symptoms:                                      Pain                                     Dyspnea YES                                     N/V                                     Appetite                                     Anxiety                                     Other _____________________                     Support Devices: non rebreather mask 13 liters              PHYSICAL EXAM:    DEFER TO PRIMARY TEAM        T(C): 39.1, Max: 39.1 (06:30)  HR: 141 (114 - 141)  BP: 119/67 (119/67 - 155/75)  RR: 26 (20 - 26)  SpO2: 93% (91% - 94%)      LABS/STUDIES:  04-12    155<H>  |  111<H>  |  89<HH>  ----------------------------<  263<H>  5.4<H>   |  20  |  6.8<HH>    Ca    9.2      12 Apr 2020 16:10    TPro  8.5<H>  /  Alb  3.1<L>  /  TBili  0.3  /  DBili  x   /  AST  28  /  ALT  29  /  AlkPhos  81  04-12                            10.7   16.07 )-----------( 387      ( 12 Apr 2020 16:10 )             35.6       MEDICATIONS  (STANDING):  calcitriol   Capsule 0.25 MICROGram(s) Oral daily  calcium carbonate 1250 mG  + Vitamin D (OsCal 500 + D) 1 Tablet(s) Oral daily  cefTRIAXone   IVPB      cefTRIAXone   IVPB 1000 milliGRAM(s) IV Intermittent every 24 hours  chlorhexidine 4% Liquid 1 Application(s) Topical <User Schedule>  dextrose 5%. 1000 milliLiter(s) (50 mL/Hr) IV Continuous <Continuous>  dextrose 50% Injectable 12.5 Gram(s) IV Push once  dextrose 50% Injectable 25 Gram(s) IV Push once  dextrose 50% Injectable 25 Gram(s) IV Push once  dipyridamole 200 mG/aspirin 25 mG 1 Capsule(s) Oral two times a day  gabapentin 100 milliGRAM(s) Oral three times a day  heparin  Injectable 5000 Unit(s) SubCutaneous every 8 hours  hydroxychloroquine 400 milliGRAM(s) Oral every 24 hours  hydroxychloroquine   Oral   insulin lispro (HumaLOG) corrective regimen sliding scale   SubCutaneous three times a day before meals  lactated ringers. 1000 milliLiter(s) (50 mL/Hr) IV Continuous <Continuous>  metoprolol succinate ER 25 milliGRAM(s) Oral daily  metroNIDAZOLE  IVPB 500 milliGRAM(s) IV Intermittent every 8 hours  polyethylene glycol 3350 17 Gram(s) Oral at bedtime  senna 2 Tablet(s) Oral at bedtime  sodium bicarbonate 650 milliGRAM(s) Oral three times a day  tiotropium 18 MICROgram(s) Capsule 1 Capsule(s) Inhalation daily    MEDICATIONS  (PRN):  acetaminophen   Tablet .. 650 milliGRAM(s) Oral every 6 hours PRN Temp greater or equal to 38C (100.4F), Mild Pain (1 - 3)  ALBUTerol    90 MICROgram(s) HFA Inhaler 2 Puff(s) Inhalation every 6 hours PRN Shortness of Breath and/or Wheezing  dextrose 40% Gel 15 Gram(s) Oral once PRN Blood Glucose LESS THAN 70 milliGRAM(s)/deciliter  glucagon  Injectable 1 milliGRAM(s) IntraMuscular once PRN Glucose LESS THAN 70 milligrams/deciliter          iStop: Reference #: [id number] - no recent meds        PPS  Level    10%       Note PPS = Palliative Performance Scale; (c)2001, [facility] Hospice Society       Range from 100% meaning Full ambulation/self-care/intake/Level of Consicous                                                                              to        10% meaning Bedbound/Unable to do any activity/extensive disease /Total Care/ No PO intake/ LOC=Full/drowsy/+/-confusion        (0% = death)                     Prior to acute illness, patient's functionality reportedly nursing home pt (+) debility

## 2020-04-13 NOTE — GOALS OF CARE CONVERSATION - ADVANCED CARE PLANNING - CONVERSATION DETAILS
Discussed with Ms Neda the closest relative to patient (as patient's son and siblings all passed away), previous MOLST form wishes and current status, as patient is in respiratory distress, and will likely need intubation, and she likely has COVID-19 with bad prognostic factors, in addition to kidney failure that might require dialysis. She understands that given age, previous CVA, dementia, kidney failure, chances of being intubated and then successfully extubated are slim. She agrees to make patient DNR/DNI, no dialysis if required, no NG tube at this time. Asking to make patient comfortable.

## 2020-04-13 NOTE — CONSULT NOTE ADULT - ASSESSMENT
Consult Summary: Pt is an 86 yr old male from [facility]. (+) COVID, h/o CVA hemiparesis and debility. Pt in respiratory failure secondary to covid. Pt's maryann sanford made pt DNR/DNI and no NGT today. Will continue ABT and IV for now. Medical resident concerned about symptoms and pt being in distress. Request palliative care team support, said pt using accessory muscles to breath with NRB mask      Morphine Equivalent Daily Dose (MEDD): 0/24hrs    Recommendations:  DNR/DNI  no NGTs  IV and ABT  Dilaudid 0.5mg IV Q 3 HRS PRN  MOLST form updated.      Please Call [phone] PRN Consult Summary: Pt is an 86 yr old male from [facility]. (+) COVID, h/o CVA hemiparesis and debility. Pt in respiratory failure secondary to covid. Pt's nepushpa Nedatony starr made pt DNR/DNI and no NGT today. Will continue ABT and IV for now. Medical resident concerned about symptoms and pt being in distress. Request palliative care team support, said pt using accessory muscles to breath with NRB mask    Call placed to Neda Starr to introduce palliative care services. L/M with palliative service call back number on niece's V/M. Medical resident made her aware of pt's grave prognosis.    Morphine Equivalent Daily Dose (MEDD): 6mg/24hrs    Recommendations:  DNR/DNI  no NGTs  IV and ABT  Dilaudid 0.5mg IV Q 3 HRS PRN  MOLST form updated.      Please Call [phone] PRN

## 2020-04-13 NOTE — GOALS OF CARE CONVERSATION - ADVANCED CARE PLANNING - TREATMENT GUIDELINE COMMENT
Ensure patient comfort; no dialysis; no feeding tubes; DNR/DNI; can give IV fluids and IV antibiotics

## 2020-04-13 NOTE — CONSULT NOTE ADULT - ASSESSMENT
83 yo female patient from [facility] with PMH of CVA  with residual left sided deficits, dementia, HTN, HLD, DM, TIA and CKD stage IV, brought in tonight due to difficulty breathing that started earlier today. Report noted that patient was at baseline. Developed cough, fever, and difficulty breathing today. EMS called. Was found to be hypoxic to 80s. Was placed on face mask prior to transport.     IMPRESSION;   COVID 19 with Hypoxemia ( NRB with no documentation of pO2 ) and CXR with b/l opacities    RECOMMENDATIONS;   ferritin, CRP, procal, dDimers  rocephin 1 gm iv q24h. Hold if procal < 0.50  BCx   PLAQUENIL 800 mg PO q24h x one dose, then 400mg x once per day for 4 more days.  -if QTc<500ms then start HCQ. No monitoring or serial EKGs required.  -if QTC>500ms then monitor EKG. Use MCOT. If tele/MCOT no serial EKGs  -d/c for QTc>550  -maintain K levels>4mEq/L, Mg>2mg/dl

## 2020-04-13 NOTE — CONSULT NOTE ADULT - ATTENDING COMMENTS
reviewed with fellow. triston probably prerenal. poor prognosis. covid work up.
86 year old woman with history of CVA now COVID positive with respiratory failure in respiratory distress  DNR/DNI with ongoing medical management  Pleasee see NP note above for recommendations for dyspnea  Palliative care will continue to follow

## 2020-04-13 NOTE — CONSULT NOTE ADULT - REASON FOR ADMISSION
Shortness of breath, desaturation

## 2020-04-13 NOTE — CONSULT NOTE ADULT - SUBJECTIVE AND OBJECTIVE BOX
JOLIE LOCK  84y, Female  Allergy: No Known Allergies      All historical available data reviewed.    HPI:  Ms. Lock is an 85 yo female patient from [facility] with PMH of CVA  with residual left sided deficits, dementia, HTN, HLD, DM, TIA and CKD stage IV, brought in tonight due to difficulty breathing that started earlier today. Report noted that patient was at baseline. Developed cough, fever, and difficulty breathing today. EMS called. Was found to be hypoxic to 80s. Was placed on face mask prior to transport.     In ED, patient was hypoxic, tachycardic, normotensive, saturating 91% on 8L/min. Chest Xray showed bilateral infiltrates, more on the right lower lobe. COVID-19 PCR sent. Patient is very poor historian, called the niece who said patient is hypotensive since admission to nursing home, and does not speak, and she says only few words when she visits her (last visit 2 weeks ago). (12 Apr 2020 17:35)    FAMILY HISTORY:    PAST MEDICAL & SURGICAL HISTORY:  Hypertension  Chronic kidney disease  Dementia  Hyperlipidemia  Diabetes mellitus  CVA (cerebrovascular accident)        VITALS:  T(F): 99.3, Max: 102.3 (04-13-20 @ 06:30)  HR: 141  BP: 119/67  RR: 24Vital Signs Last 24 Hrs  T(C): 37.4 (13 Apr 2020 07:56), Max: 39.1 (13 Apr 2020 06:30)  T(F): 99.3 (13 Apr 2020 07:56), Max: 102.3 (13 Apr 2020 06:30)  HR: 141 (13 Apr 2020 09:03) (114 - 141)  BP: 119/67 (13 Apr 2020 09:03) (119/67 - 155/75)  BP(mean): --  RR: 24 (13 Apr 2020 07:56) (20 - 26)  SpO2: 92% (13 Apr 2020 07:56) (91% - 94%)    TESTS & MEASUREMENTS:                        10.7   16.07 )-----------( 387      ( 12 Apr 2020 16:10 )             35.6     04-12    155<H>  |  111<H>  |  89<HH>  ----------------------------<  263<H>  5.4<H>   |  20  |  6.8<HH>    Ca    9.2      12 Apr 2020 16:10    TPro  8.5<H>  /  Alb  3.1<L>  /  TBili  0.3  /  DBili  x   /  AST  28  /  ALT  29  /  AlkPhos  81  04-12    LIVER FUNCTIONS - ( 12 Apr 2020 16:10 )  Alb: 3.1 g/dL / Pro: 8.5 g/dL / ALK PHOS: 81 U/L / ALT: 29 U/L / AST: 28 U/L / GGT: x                   RADIOLOGY & ADDITIONAL TESTS:  Personal review of radiological diagnostics performed  Echo and EKG results noted when applicable.     MEDICATIONS:  acetaminophen  Suppository .. 650 milliGRAM(s) Rectal every 6 hours PRN  ALBUTerol    90 MICROgram(s) HFA Inhaler 2 Puff(s) Inhalation every 6 hours PRN  cefTRIAXone   IVPB      cefTRIAXone   IVPB 1000 milliGRAM(s) IV Intermittent every 24 hours  chlorhexidine 4% Liquid 1 Application(s) Topical <User Schedule>  dextrose 40% Gel 15 Gram(s) Oral once PRN  dextrose 5%. 1000 milliLiter(s) IV Continuous <Continuous>  dextrose 50% Injectable 12.5 Gram(s) IV Push once  dextrose 50% Injectable 25 Gram(s) IV Push once  dextrose 50% Injectable 25 Gram(s) IV Push once  glucagon  Injectable 1 milliGRAM(s) IntraMuscular once PRN  heparin  Injectable 5000 Unit(s) SubCutaneous every 8 hours  HYDROmorphone  Injectable 0.5 milliGRAM(s) IV Push every 3 hours PRN  insulin lispro (HumaLOG) corrective regimen sliding scale   SubCutaneous three times a day before meals  lactated ringers. 1000 milliLiter(s) IV Continuous <Continuous>  metroNIDAZOLE  IVPB 500 milliGRAM(s) IV Intermittent every 8 hours  tiotropium 18 MICROgram(s) Capsule 1 Capsule(s) Inhalation daily      ANTIBIOTICS:  cefTRIAXone   IVPB      cefTRIAXone   IVPB 1000 milliGRAM(s) IV Intermittent every 24 hours  metroNIDAZOLE  IVPB 500 milliGRAM(s) IV Intermittent every 8 hours

## 2021-02-14 NOTE — DISCHARGE NOTE FOR THE EXPIRED PATIENT - HOSPITAL COURSE
Patient was admitted on 4/12 with respiratory failure. COVID-19 PCR was detected, chest Xray with bilateral interstitial infiltrates. O2 saturation was 88% with 15 L NRB. Patient also was found to have SINDY on top of CKD. Started on IV fluids, and supportive treatment, but condition did not improve. Gladis Red called, and informed regarding the poor prognosis given comorbidities, age, and multiorgan failure. She agrees on making patient comfortable, and agrees on DNR and DNI. Patient continued to decline, and at 11:55 she was found pulseless. No

## 2022-03-14 NOTE — CONSULT NOTE ADULT - MINUTES
Patient placed in Droplet isolation for possible pneumonia. Patient educated on isolation and questions answered. Isolation sign placed on door to alert staff of precautions.   
35
30
